# Patient Record
Sex: FEMALE | Race: WHITE | URBAN - METROPOLITAN AREA
[De-identification: names, ages, dates, MRNs, and addresses within clinical notes are randomized per-mention and may not be internally consistent; named-entity substitution may affect disease eponyms.]

---

## 2021-01-07 ENCOUNTER — APPOINTMENT (RX ONLY)
Dept: URBAN - METROPOLITAN AREA CLINIC 11 | Facility: CLINIC | Age: 66
Setting detail: DERMATOLOGY
End: 2021-01-07

## 2021-01-07 DIAGNOSIS — H61.11 ACQUIRED DEFORMITY OF PINNA: ICD-10-CM

## 2021-01-07 DIAGNOSIS — Z41.1 ENCOUNTER FOR COSMETIC SURGERY: ICD-10-CM

## 2021-01-07 PROBLEM — H61.113 ACQUIRED DEFORMITY OF PINNA, BILATERAL: Status: ACTIVE | Noted: 2021-01-07

## 2021-01-07 PROCEDURE — ? ORDER FOR SURGERY

## 2021-01-07 PROCEDURE — ? CONSULTATION - STRETCHED EARLOBE

## 2021-01-07 PROCEDURE — ? SKIN CARE REGIMEN

## 2021-01-07 PROCEDURE — ? PRESCRIPTION

## 2021-01-07 PROCEDURE — 99003: CPT

## 2021-01-07 RX ORDER — TRETIONIN 0.5 MG/G
CREAM TOPICAL
Qty: 1 | Refills: 3 | COMMUNITY
Start: 2021-01-07

## 2021-01-07 RX ADMIN — TRETIONIN: 0.5 CREAM TOPICAL at 00:00

## 2021-01-07 ASSESSMENT — LOCATION DETAILED DESCRIPTION DERM
LOCATION DETAILED: RIGHT ANTERIOR EARLOBE
LOCATION DETAILED: LEFT ANTERIOR EARLOBE
LOCATION DETAILED: LEFT CENTRAL MALAR CHEEK
LOCATION DETAILED: RIGHT CENTRAL MALAR CHEEK

## 2021-01-07 ASSESSMENT — LOCATION SIMPLE DESCRIPTION DERM
LOCATION SIMPLE: RIGHT EAR
LOCATION SIMPLE: LEFT EAR
LOCATION SIMPLE: LEFT CHEEK
LOCATION SIMPLE: RIGHT CHEEK

## 2021-01-07 ASSESSMENT — LOCATION ZONE DERM
LOCATION ZONE: EAR
LOCATION ZONE: FACE

## 2021-01-07 NOTE — PROCEDURE: ORDER FOR SURGERY
Admission Status: outpatient
Facility: TBD
Priority: normal
Date Of Surgery: 02/15/2021
Audit Log: 0
Detail Level: Detailed
Surgery To Be Ordered: bilateral earlobe repair
Time Frame Unit: day(s)
Provider: Chad Adams, DO
Surgeon: Dr. Darren Adams
Cosmetic, Major Or Minor?: Cosmetic
Estimated Length Of Surgery: 45 minutes

## 2023-12-21 ENCOUNTER — APPOINTMENT (RX ONLY)
Dept: URBAN - METROPOLITAN AREA CLINIC 342 | Facility: CLINIC | Age: 68
Setting detail: DERMATOLOGY
End: 2023-12-21

## 2023-12-21 DIAGNOSIS — Z41.9 ENCOUNTER FOR PROCEDURE FOR PURPOSES OTHER THAN REMEDYING HEALTH STATE, UNSPECIFIED: ICD-10-CM

## 2023-12-21 DIAGNOSIS — L81.4 OTHER MELANIN HYPERPIGMENTATION: ICD-10-CM

## 2023-12-21 DIAGNOSIS — L82.1 OTHER SEBORRHEIC KERATOSIS: ICD-10-CM

## 2023-12-21 DIAGNOSIS — D22 MELANOCYTIC NEVI: ICD-10-CM

## 2023-12-21 DIAGNOSIS — D18.0 HEMANGIOMA: ICD-10-CM

## 2023-12-21 DIAGNOSIS — D16 BENIGN NEOPLASM OF BONE AND ARTICULAR CARTILAGE: ICD-10-CM

## 2023-12-21 PROBLEM — D18.01 HEMANGIOMA OF SKIN AND SUBCUTANEOUS TISSUE: Status: ACTIVE | Noted: 2023-12-21

## 2023-12-21 PROBLEM — D16.9 BENIGN NEOPLASM OF BONE AND ARTICULAR CARTILAGE, UNSPECIFIED: Status: ACTIVE | Noted: 2023-12-21

## 2023-12-21 PROBLEM — D22.5 MELANOCYTIC NEVI OF TRUNK: Status: ACTIVE | Noted: 2023-12-21

## 2023-12-21 PROCEDURE — ? COUNSELING

## 2023-12-21 PROCEDURE — ? ADDITIONAL NOTES

## 2023-12-21 PROCEDURE — ? COSMETIC QUOTE

## 2023-12-21 PROCEDURE — ? FULL BODY SKIN EXAM

## 2023-12-21 PROCEDURE — 99203 OFFICE O/P NEW LOW 30 MIN: CPT

## 2023-12-21 PROCEDURE — ? TREATMENT REGIMEN

## 2023-12-21 ASSESSMENT — LOCATION DETAILED DESCRIPTION DERM
LOCATION DETAILED: MID-OCCIPITAL SCALP
LOCATION DETAILED: RIGHT POSTERIOR EAR
LOCATION DETAILED: LEFT CENTRAL TEMPLE
LOCATION DETAILED: PERIUMBILICAL SKIN
LOCATION DETAILED: EPIGASTRIC SKIN

## 2023-12-21 ASSESSMENT — LOCATION ZONE DERM
LOCATION ZONE: TRUNK
LOCATION ZONE: EAR
LOCATION ZONE: SCALP
LOCATION ZONE: FACE

## 2023-12-21 ASSESSMENT — LOCATION SIMPLE DESCRIPTION DERM
LOCATION SIMPLE: POSTERIOR SCALP
LOCATION SIMPLE: ABDOMEN
LOCATION SIMPLE: RIGHT EAR
LOCATION SIMPLE: LEFT TEMPLE

## 2023-12-21 NOTE — PROCEDURE: ADDITIONAL NOTES
Detail Level: Zone
Render Risk Assessment In Note?: yes
Additional Notes: Hydrafacial is recommended every 3-4 months\\nVi peel is recommended every 6mo-1 yr\\nIPL may require multiple treatments (1-3) once per month to treat redness and broken capillaries\\n\\nSetting realistic expectations was difficult. Advised even though the patient is already utilizing a solid skincare regimen, the concerns she has will only be addressed by professional aesthetic treatment, as product can only do so much.\\n\\nThe patient continued to state how her skin has recently changed and she has never had this issue before and felt it is being caused by her products. I had stated sometimes products can contribute to clogged pores, but overall pore build up is a natural occurrence, and requires a deep cleaning and not just once. I provided the analogy of seeing a dental hygienist. \\n\\nThe patient was very vocal about the cost of hydrafacial and it seemed expensive. The patient repeatedly asked if just getting a basic facial with steam would help open the pores. I educated the patient on pore function. I had stated “pores are not doors, they don’t open and close.” I had informed her steam helps lubricate the pores, allowing easier extractions, and naturally when the pores are cleaned out, they appear smaller. I explained how hydrafacial works in that manner and all of its added benefits.\\n\\nWe discussed package pricing and care credit as payment option.\\n\\nI did inform the patient I was concerned with her expectations, and not being able to deliver what she feels would be worthy of a “$300 facial”. I expressed it is not a miracle, and how I would strongly suggest routinely doing them every 3-4 months to keep her pores clean. I expressed to the patient, I did not want her to spend $300 on the hydrafacial unless she truly understood the expectations and how I want her to have a positive experience.

## 2023-12-21 NOTE — PROCEDURE: COSMETIC QUOTE
Implant 10 Price/Unit (In Dollars- Use Only Numbers And Decimals): 0.00
Laser 14 Percentage Discount: 0
Laser 1 Price/Unit (In Dollars- Use Only Numbers And Decimals): 550
Face Procedure 6 Price/Unit (In Dollars- Use Only Numbers And Decimals): 400
Face Procedure 3 Price/Unit (In Dollars- Use Only Numbers And Decimals): 400.00
Face Procedure 9: extraction
Laser 2: IPL
Face Procedure 4: Hydrafacial
Apply Facility Fee: No
Face Procedure 1: Subnovii (Perioral + Lowerface)
Face Procedure 10: Microneedling
Face Procedure 1 Price/Unit (In Dollars- Use Only Numbers And Decimals): 3000.00
Face Procedure 7 Units: 1
Face Procedure 5: vi peel
Face Procedure 2: Subnovii (Neck)
Face Procedure 8 Price/Unit (In Dollars- Use Only Numbers And Decimals): 350
Face Procedure 6: skin resurfacing (cheeks only)
Face Procedure 3: Subnovii under eye
Face Procedure 9 Price/Unit (In Dollars- Use Only Numbers And Decimals): 25
Laser 2 Price/Unit (In Dollars- Use Only Numbers And Decimals): 450
Face Procedure 4 Price/Unit (In Dollars- Use Only Numbers And Decimals): 299
Include Sales Tax On Surgeon's Fees: Yes
Detail Level: Zone
Face Procedure 5 Price/Unit (In Dollars- Use Only Numbers And Decimals): 429
Face Procedure 2 Price/Unit (In Dollars- Use Only Numbers And Decimals): $3000
Face Procedure 8: Subnovii (SK removal)
Laser 1: [facility] Viva Skin Resurfacing

## 2023-12-21 NOTE — PROCEDURE: ADDITIONAL NOTES
Render Risk Assessment In Note?: no
Additional Notes: Pt states she had trauma to the affected area years ago that left her with the affected area. 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
Detail Level: Detailed